# Patient Record
Sex: FEMALE | Race: WHITE
[De-identification: names, ages, dates, MRNs, and addresses within clinical notes are randomized per-mention and may not be internally consistent; named-entity substitution may affect disease eponyms.]

---

## 2019-08-01 ENCOUNTER — HOSPITAL ENCOUNTER (OUTPATIENT)
Dept: HOSPITAL 92 - SDC | Age: 64
Discharge: HOME | End: 2019-08-01
Attending: INTERNAL MEDICINE
Payer: MEDICARE

## 2019-08-01 VITALS — BODY MASS INDEX: 28.1 KG/M2

## 2019-08-01 DIAGNOSIS — Z88.2: ICD-10-CM

## 2019-08-01 DIAGNOSIS — K31.89: ICD-10-CM

## 2019-08-01 DIAGNOSIS — Z79.899: ICD-10-CM

## 2019-08-01 DIAGNOSIS — F17.210: ICD-10-CM

## 2019-08-01 DIAGNOSIS — E55.9: ICD-10-CM

## 2019-08-01 DIAGNOSIS — Z88.8: ICD-10-CM

## 2019-08-01 DIAGNOSIS — K63.5: ICD-10-CM

## 2019-08-01 DIAGNOSIS — K44.9: ICD-10-CM

## 2019-08-01 DIAGNOSIS — F41.9: ICD-10-CM

## 2019-08-01 DIAGNOSIS — K21.0: ICD-10-CM

## 2019-08-01 DIAGNOSIS — E72.20: ICD-10-CM

## 2019-08-01 DIAGNOSIS — Z88.0: ICD-10-CM

## 2019-08-01 DIAGNOSIS — K22.10: ICD-10-CM

## 2019-08-01 DIAGNOSIS — D12.2: ICD-10-CM

## 2019-08-01 DIAGNOSIS — Z12.11: Primary | ICD-10-CM

## 2019-08-01 DIAGNOSIS — K57.30: ICD-10-CM

## 2019-08-01 PROCEDURE — 88305 TISSUE EXAM BY PATHOLOGIST: CPT

## 2019-08-01 PROCEDURE — 88312 SPECIAL STAINS GROUP 1: CPT

## 2019-08-01 PROCEDURE — 0DBK8ZX EXCISION OF ASCENDING COLON, VIA NATURAL OR ARTIFICIAL OPENING ENDOSCOPIC, DIAGNOSTIC: ICD-10-PCS | Performed by: INTERNAL MEDICINE

## 2019-08-01 PROCEDURE — 0DB68ZX EXCISION OF STOMACH, VIA NATURAL OR ARTIFICIAL OPENING ENDOSCOPIC, DIAGNOSTIC: ICD-10-PCS | Performed by: INTERNAL MEDICINE

## 2019-08-01 PROCEDURE — 0DBN8ZX EXCISION OF SIGMOID COLON, VIA NATURAL OR ARTIFICIAL OPENING ENDOSCOPIC, DIAGNOSTIC: ICD-10-PCS | Performed by: INTERNAL MEDICINE

## 2019-08-01 NOTE — OP
DATE OF PROCEDURE:  08/01/2019



PROCEDURES PERFORMED:  Esophagogastroduodenoscopy with biopsy, colonoscopy with

polypectomy. 



INDICATIONS FOR PROCEDURE:  Chronic nausea and vomiting, midepigastric abdominal

pain, screening for malignant neoplasm of the colon. 



DESCRIPTION OF PROCEDURE:  After the risks and benefits of the procedure were

explained to the patient and her surrogate including risks of bleeding, infection,

perforation, reactions to anesthesia, aspiration and/or pain, informed consent was

obtained.  The patient was then taken to the endoscopy suite, where deep sedation

was administered via propofol and anesthesia support.  Once adequate sedation was

achieved, the standard gastroscope was introduced into the mouth with intubation of

the esophagus, stomach, and the proximal small intestine with the findings listed

below.  The patient tolerated the procedure well with no immediate perioperative

complications.  Upon conclusion of this portion of the procedure, all equipment was

removed from the patient and the bed was then rotated 180 degrees in anticipation of

the colonoscopy.  A digital rectal examination was performed followed by

introduction of the standard colonoscope, which was then advanced to the terminal

ileum with some difficulty due to prep.  The quality of the prep was fair with a

large amount of liquid stool and a mild amount of solid retained stool seen

throughout the entire colon.  The patient tolerated the procedure well with no

immediate perioperative complications.  Upon completion of the colonoscopy, all

equipment was removed from the patient, she was transferred to Day Stay in

satisfactory condition. 



EGD FINDINGS:  Esophagus:  Normal-appearing mucosa was seen in the proximal and mid

esophagus.  However, in the distal esophagus, there were multiple linear erosions

extending proximally from the GE junction measuring approximately 3 to 4 cm in

length.  They did involve the entire circumference of the esophagus, but were not

continuous with one another between folds.  There was no associated mass, lesions,

or active/recent bleeding.  The diaphragmatic pinch was seen at 35 cm while the GE

junction was seen at 37 cm denoting a 2 cm hiatal hernia.  Otherwise, there was no

evidence of ulcerations, mass, lesions, or active/recent bleeding. 



Stomach:  A large amount of retained liquids were seen in the stomach with

approximately 500 mL retrieved upon entry into the stomach.  Upon visualization of

the gastric mucosa, increased mucosal erythema was seen throughout the entire

stomach with no other associated pathology.  Multiple biopsies were taken for

evaluation.  There was no evidence of erosions, ulcerations, mass, lesions, or

active/recent bleeding. 



Duodenum:  Normal-appearing mucosa was seen in the duodenal bulb, first and second

portion of the duodenum.  There was no evidence of erosions, ulcerations, mass,

lesions, or active/recent bleeding. 



IMPRESSION:  

1. Ramsey grade C reflux mediated erosive esophagitis.

2. Increased retained gastric contents concerning for gastroparesis.

3. Diffuse moderately severe gastric erythema, status post biopsies.

4. No evidence of gastric or duodenal outlet obstruction.



COLONOSCOPY FINDINGS:  Digital rectal exam:  Normal findings were seen on external

examination. 



Colon findings:  A large amount of retained liquid stool was seen throughout the

entire colon with inadequate visualization of the colonic mucosa given the fair

prep.  A moderate amount of retained particulate matter was also seen upon

suctioning away the liquid.  Again interfering with visualization of the colonic

mucosa, the mucosa seen throughout this procedure (approximately 80% to 90%).

Normal-appearing mucosa was seen within the terminal ileum as well as at the

appendiceal orifice and ileocecal valve.  Normal-appearing mucosa was also seen in

the cecum.  However, 2 polyps measuring 3 to 4 mm in size were seen in the proximal

ascending colon and completely removed with cold snare polypectomy.  The resection

was complete, however, only one polyp was able to be retrieved.  Normal-appearing

mucosa was then seen in the distal ascending, transverse, and descending colons.

Scattered small and large diverticula were seen within the sigmoid colon.  A 4-to

5-mm polyp was seen in the sigmoid colon and completely removed with cold snare

polypectomy.  It was retrieved and placed in a specimen jar for evaluation.

Normal-appearing mucosa was then seen within the rectum with normal findings on

rectal retroflexion. 



IMPRESSION:  

1. Retained liquid and solid stool preventing adequate visualization of the colonic

mucosa, lesions less than 5 mm in size may have been missed. 

2. Two 3-to 4-mm ascending colon polyp, status post cold snare polypectomy.

3. A 4-to 5-mm sigmoid colon polyp, status post cold snare polypectomy.

4. Mild-to-moderate sigmoid diverticulosis.



RECOMMENDATIONS:  

1. We would place the patient on omeprazole 40 mg daily for erosive esophagitis if

she has not already. 

2. We would obtain a gastric emptying study as an outpatient for further evaluation

of her chronic nausea and vomiting. 

3. We would re-evaluate her medication list for any medications that could

potentially decrease gastric emptying. 

4. We will follow up on the biopsy results with repeat upper endoscopy and

colonoscopy intervals depending on pathology results. 

5. Continue other medications as prescribed by the primary provider.







Job ID:  [id number]